# Patient Record
Sex: FEMALE | Race: ASIAN
[De-identification: names, ages, dates, MRNs, and addresses within clinical notes are randomized per-mention and may not be internally consistent; named-entity substitution may affect disease eponyms.]

---

## 2020-04-01 ENCOUNTER — HOSPITAL ENCOUNTER (OUTPATIENT)
Dept: HOSPITAL 62 - RAD | Age: 45
End: 2020-04-01
Attending: PODIATRIST
Payer: OTHER GOVERNMENT

## 2020-04-01 DIAGNOSIS — Q72.819: Primary | ICD-10-CM

## 2020-04-01 PROCEDURE — 77073 BONE LENGTH STUDIES: CPT

## 2020-04-01 NOTE — RADIOLOGY REPORT (SQ)
EXAM DESCRIPTION:  CT BONE LENGTH



IMAGES COMPLETED DATE/TIME:  4/1/2020 12:49 pm



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



COMPARISON:  None.



TECHNIQUE:  CT scanogram of the bilateral lower extremities is performed including pelvis to ankles. 
Measurements of femur, tibia, and entire lower extremities performed by the radiologist and saved to 
PACS.

All CT scanners at this facility use dose modulation, iterative reconstruction, and/or weight based d
osing when appropriate to reduce radiation dose to as low as reasonably achievable (ALARA).

[facility]: Dose Right  [facility]: CareDose    [facility]: Dose Right    [facility]: Teradose 4D    [facility]: [employer] Technologies



RADIATION DOSE:  0.01 MGy.



LIMITATIONS:  None.



FINDINGS:  RIGHT:

FEMUR: 40.2 cm.

TIBIA: 31.3 cm.

TOTAL RIGHT LOWER EXTREMITY LENGTH: 72  cm.

LEFT:

FEMUR: 40.2 cm.

TIBIA: 31.4 cm.

TOTAL LEFT LOWER EXTREMITY LENGTH: 72 cm.



IMPRESSION:  LEG LENGTH MEASUREMENTS AS DETAILED ABOVE.



TECHNICAL DOCUMENTATION:  JOB ID:  [id number]

Quality ID # [id number]: Final reports with documentation of one or more dose reduction techniques (e.g., Au
tomated exposure control, adjustment of the mA and/or kV according to patient size, use of iterative 
reconstruction technique)

 2011 [employer]- All Rights Reserved



Reading location - IP/workstation name: [id number]